# Patient Record
Sex: MALE | Race: WHITE | NOT HISPANIC OR LATINO
[De-identification: names, ages, dates, MRNs, and addresses within clinical notes are randomized per-mention and may not be internally consistent; named-entity substitution may affect disease eponyms.]

---

## 2017-01-11 ENCOUNTER — APPOINTMENT (OUTPATIENT)
Dept: FAMILY MEDICINE | Facility: CLINIC | Age: 62
End: 2017-01-11

## 2017-02-25 ENCOUNTER — APPOINTMENT (OUTPATIENT)
Dept: FAMILY MEDICINE | Facility: CLINIC | Age: 62
End: 2017-02-25

## 2017-02-25 VITALS
DIASTOLIC BLOOD PRESSURE: 74 MMHG | RESPIRATION RATE: 12 BRPM | HEIGHT: 69 IN | BODY MASS INDEX: 29.33 KG/M2 | WEIGHT: 198 LBS | HEART RATE: 97 BPM | OXYGEN SATURATION: 99 % | SYSTOLIC BLOOD PRESSURE: 126 MMHG

## 2017-03-25 ENCOUNTER — APPOINTMENT (OUTPATIENT)
Dept: FAMILY MEDICINE | Facility: CLINIC | Age: 62
End: 2017-03-25

## 2017-06-02 ENCOUNTER — RX RENEWAL (OUTPATIENT)
Age: 62
End: 2017-06-02

## 2017-06-16 ENCOUNTER — APPOINTMENT (OUTPATIENT)
Dept: FAMILY MEDICINE | Facility: CLINIC | Age: 62
End: 2017-06-16

## 2017-06-29 ENCOUNTER — LABORATORY RESULT (OUTPATIENT)
Age: 62
End: 2017-06-29

## 2017-06-30 ENCOUNTER — LABORATORY RESULT (OUTPATIENT)
Age: 62
End: 2017-06-30

## 2017-06-30 ENCOUNTER — APPOINTMENT (OUTPATIENT)
Dept: FAMILY MEDICINE | Facility: CLINIC | Age: 62
End: 2017-06-30

## 2017-06-30 VITALS
OXYGEN SATURATION: 98 % | HEART RATE: 92 BPM | BODY MASS INDEX: 28.88 KG/M2 | WEIGHT: 195 LBS | SYSTOLIC BLOOD PRESSURE: 128 MMHG | DIASTOLIC BLOOD PRESSURE: 70 MMHG | HEIGHT: 69 IN | TEMPERATURE: 98.3 F | RESPIRATION RATE: 12 BRPM

## 2017-07-03 LAB
25(OH)D3 SERPL-MCNC: 43.1 NG/ML
ALBUMIN SERPL ELPH-MCNC: 4.3 G/DL
ALP BLD-CCNC: 65 U/L
ALT SERPL-CCNC: 30 U/L
ANION GAP SERPL CALC-SCNC: 16 MMOL/L
APPEARANCE: ABNORMAL
AST SERPL-CCNC: 21 U/L
BASOPHILS # BLD AUTO: 0.01 K/UL
BASOPHILS NFR BLD AUTO: 0.2 %
BILIRUB SERPL-MCNC: 0.2 MG/DL
BILIRUBIN URINE: NEGATIVE
BLOOD URINE: ABNORMAL
BUN SERPL-MCNC: 18 MG/DL
C PEPTIDE SERPL-MCNC: 5.5 NG/ML
CALCIUM SERPL-MCNC: 12.1 MG/DL
CHLORIDE SERPL-SCNC: 102 MMOL/L
CHOLEST SERPL-MCNC: 141 MG/DL
CHOLEST/HDLC SERPL: 3.5 RATIO
CO2 SERPL-SCNC: 23 MMOL/L
COLOR: YELLOW
CREAT SERPL-MCNC: 1.15 MG/DL
EOSINOPHIL # BLD AUTO: 0.14 K/UL
EOSINOPHIL NFR BLD AUTO: 2.2 %
GLUCOSE QUALITATIVE U: 100 MG/DL
GLUCOSE SERPL-MCNC: 219 MG/DL
HBA1C MFR BLD HPLC: 8.7 %
HCT VFR BLD CALC: 38.7 %
HDLC SERPL-MCNC: 40 MG/DL
HGB BLD-MCNC: 12.9 G/DL
IMM GRANULOCYTES NFR BLD AUTO: 0.3 %
KETONES URINE: NEGATIVE
LDLC SERPL CALC-MCNC: 61 MG/DL
LEUKOCYTE ESTERASE URINE: ABNORMAL
LYMPHOCYTES # BLD AUTO: 1.66 K/UL
LYMPHOCYTES NFR BLD AUTO: 26.1 %
MAN DIFF?: NORMAL
MCHC RBC-ENTMCNC: 30.9 PG
MCHC RBC-ENTMCNC: 33.3 GM/DL
MCV RBC AUTO: 92.8 FL
MONOCYTES # BLD AUTO: 0.44 K/UL
MONOCYTES NFR BLD AUTO: 6.9 %
NEUTROPHILS # BLD AUTO: 4.08 K/UL
NEUTROPHILS NFR BLD AUTO: 64.3 %
NITRITE URINE: NEGATIVE
PH URINE: 6
PLATELET # BLD AUTO: 193 K/UL
POTASSIUM SERPL-SCNC: 4.7 MMOL/L
PROT SERPL-MCNC: 6.8 G/DL
PROTEIN URINE: 30 MG/DL
RBC # BLD: 4.17 M/UL
RBC # FLD: 13.4 %
SODIUM SERPL-SCNC: 141 MMOL/L
SPECIFIC GRAVITY URINE: 1.01
TRIGL SERPL-MCNC: 198 MG/DL
UROBILINOGEN URINE: NORMAL MG/DL
WBC # FLD AUTO: 6.35 K/UL

## 2017-07-14 ENCOUNTER — APPOINTMENT (OUTPATIENT)
Dept: FAMILY MEDICINE | Facility: CLINIC | Age: 62
End: 2017-07-14

## 2017-07-14 VITALS
HEART RATE: 88 BPM | BODY MASS INDEX: 28.44 KG/M2 | DIASTOLIC BLOOD PRESSURE: 78 MMHG | OXYGEN SATURATION: 98 % | SYSTOLIC BLOOD PRESSURE: 146 MMHG | TEMPERATURE: 98.4 F | WEIGHT: 192 LBS | RESPIRATION RATE: 12 BRPM | HEIGHT: 69 IN

## 2017-07-17 LAB
CALCIUM SERPL-MCNC: 12.2 MG/DL
PARATHYROID HORMONE INTACT: 85 PG/ML
T4 FREE SERPL-MCNC: 1.2 NG/DL
T4 SERPL-MCNC: 8.1 UG/DL
TSH SERPL-ACNC: 3.38 UIU/ML

## 2017-08-10 ENCOUNTER — RX RENEWAL (OUTPATIENT)
Age: 62
End: 2017-08-10

## 2018-05-04 ENCOUNTER — APPOINTMENT (OUTPATIENT)
Dept: FAMILY MEDICINE | Facility: CLINIC | Age: 63
End: 2018-05-04
Payer: COMMERCIAL

## 2018-05-04 VITALS
WEIGHT: 194 LBS | TEMPERATURE: 98.2 F | OXYGEN SATURATION: 98 % | HEIGHT: 69 IN | DIASTOLIC BLOOD PRESSURE: 74 MMHG | SYSTOLIC BLOOD PRESSURE: 136 MMHG | HEART RATE: 93 BPM | BODY MASS INDEX: 28.73 KG/M2 | RESPIRATION RATE: 12 BRPM

## 2018-05-04 PROCEDURE — 99214 OFFICE O/P EST MOD 30 MIN: CPT

## 2018-05-14 ENCOUNTER — RX RENEWAL (OUTPATIENT)
Age: 63
End: 2018-05-14

## 2018-05-25 ENCOUNTER — APPOINTMENT (OUTPATIENT)
Dept: FAMILY MEDICINE | Facility: CLINIC | Age: 63
End: 2018-05-25
Payer: COMMERCIAL

## 2018-05-25 VITALS
OXYGEN SATURATION: 98 % | SYSTOLIC BLOOD PRESSURE: 136 MMHG | DIASTOLIC BLOOD PRESSURE: 74 MMHG | HEIGHT: 69 IN | TEMPERATURE: 98.3 F | BODY MASS INDEX: 27.7 KG/M2 | WEIGHT: 187 LBS | HEART RATE: 90 BPM | RESPIRATION RATE: 12 BRPM

## 2018-05-25 LAB
BUN SERPL-MCNC: 18
CA-I SERPL-SCNC: 1.3
CHLORIDE SERPL-SCNC: 105
CO2 BLDA-SCNC: 24
CREAT SERPL-MCNC: 1.1
GLUCOSE SERPL-MCNC: 320
HBA1C MFR BLD HPLC: 11.2
HEMOGLOBIN: 12.9
NT-PROBNP SERPL-MCNC: NORMAL
POTASSIUM SERPL-SCNC: 4
SODIUM SERPL-SCNC: 137

## 2018-05-25 PROCEDURE — 80047 BASIC METABLC PNL IONIZED CA: CPT | Mod: QW

## 2018-05-25 PROCEDURE — 83036 HEMOGLOBIN GLYCOSYLATED A1C: CPT | Mod: QW

## 2018-05-25 PROCEDURE — 99214 OFFICE O/P EST MOD 30 MIN: CPT | Mod: 25

## 2018-05-25 NOTE — HISTORY OF PRESENT ILLNESS
[FreeTextEntry1] : F/U Apt. to Review Lab Testing [de-identified] : F/U Apt. to Review Lab Testing

## 2018-05-25 NOTE — REVIEW OF SYSTEMS
[Patient Intake Form Reviewed] : Patient intake form was reviewed [Fatigue] : fatigue [Back Pain] : back pain [Anxiety] : anxiety [Negative] : Heme/Lymph [FreeTextEntry2] : Overweight [FreeTextEntry5] : HLD, HTN [FreeTextEntry8] : BPH w LUTS [FreeTextEntry9] : LS Radiculopathy [FreeTextEntry1] : DM, Low Vit. D, Hypercalcemia, Hyperthyroidism

## 2018-05-25 NOTE — HISTORY OF PRESENT ILLNESS
[FreeTextEntry1] : F/U Apt. to Review Lab Testing [de-identified] : F/U Apt. to Review Lab Testing

## 2018-06-15 ENCOUNTER — APPOINTMENT (OUTPATIENT)
Dept: FAMILY MEDICINE | Facility: CLINIC | Age: 63
End: 2018-06-15

## 2018-11-01 ENCOUNTER — RX RENEWAL (OUTPATIENT)
Age: 63
End: 2018-11-01

## 2018-12-01 ENCOUNTER — APPOINTMENT (OUTPATIENT)
Dept: FAMILY MEDICINE | Facility: CLINIC | Age: 63
End: 2018-12-01

## 2018-12-20 ENCOUNTER — LABORATORY RESULT (OUTPATIENT)
Age: 63
End: 2018-12-20

## 2018-12-20 ENCOUNTER — APPOINTMENT (OUTPATIENT)
Dept: FAMILY MEDICINE | Facility: CLINIC | Age: 63
End: 2018-12-20
Payer: COMMERCIAL

## 2018-12-20 VITALS
HEIGHT: 69 IN | WEIGHT: 187 LBS | DIASTOLIC BLOOD PRESSURE: 82 MMHG | OXYGEN SATURATION: 98 % | SYSTOLIC BLOOD PRESSURE: 140 MMHG | TEMPERATURE: 98 F | BODY MASS INDEX: 27.7 KG/M2 | HEART RATE: 81 BPM

## 2018-12-20 PROCEDURE — 99214 OFFICE O/P EST MOD 30 MIN: CPT

## 2018-12-20 RX ORDER — LINAGLIPTIN 5 MG/1
5 TABLET, FILM COATED ORAL DAILY
Qty: 90 | Refills: 3 | Status: DISCONTINUED | COMMUNITY
Start: 2017-07-14 | End: 2018-12-20

## 2018-12-20 RX ORDER — PIOGLITAZONE HYDROCHLORIDE 15 MG/1
15 TABLET ORAL
Qty: 90 | Refills: 3 | Status: DISCONTINUED | COMMUNITY
Start: 2018-05-25 | End: 2018-12-20

## 2018-12-20 NOTE — PLAN
[FreeTextEntry1] : Patient in for follow up Patient s/p CABG 4 vessels  3 weeks  at [facility]  of HTN HLD Fatigue comes with wife

## 2018-12-20 NOTE — HISTORY OF PRESENT ILLNESS
[FreeTextEntry1] : Patient in for follow up Patient s/p CABG 4 vessels  3 weeks  at [facility]  of HTN HLD Fatigue comes with wife

## 2018-12-20 NOTE — REVIEW OF SYSTEMS
[Negative] : Heme/Lymph [Fever] : no fever [Chills] : no chills [Fatigue] : no fatigue [Pain] : no pain [Redness] : no redness [Itching] : no itching [Earache] : no earache [Hearing Loss] : no hearing loss [Nosebleeds] : no nosebleeds [Nasal Discharge] : no nasal discharge [Sore Throat] : no sore throat [Chest Pain] : no chest pain [Palpitations] : no palpitations [Claudication] : no  leg claudication [Wheezing] : no wheezing [Cough] : no cough [Dysuria] : no dysuria [Incontinence] : no incontinence [Hesitancy] : no hesitancy [Nocturia] : no nocturia [Hematuria] : no hematuria [Frequency] : no frequency [Joint Pain] : no joint pain [Joint Stiffness] : no joint stiffness [Muscle Pain] : no muscle pain [Muscle Weakness] : no muscle weakness [Back Pain] : no back pain [Joint Swelling] : no joint swelling [Mole Changes] : no mole changes [Nail Changes] : no nail changes [Hair Changes] : no hair changes [Headache] : no headache [Dizziness] : no dizziness [Fainting] : no fainting [Memory Loss] : no memory loss

## 2018-12-21 LAB
24R-OH-CALCIDIOL SERPL-MCNC: 86.1 PG/ML
ALBUMIN SERPL ELPH-MCNC: 5.1 G/DL
ALP BLD-CCNC: 71 U/L
ALT SERPL-CCNC: 23 U/L
ANION GAP SERPL CALC-SCNC: 12 MMOL/L
APPEARANCE: ABNORMAL
AST SERPL-CCNC: 20 U/L
BASOPHILS # BLD AUTO: 0.02 K/UL
BASOPHILS NFR BLD AUTO: 0.3 %
BILIRUB SERPL-MCNC: 0.5 MG/DL
BILIRUBIN URINE: NEGATIVE
BLOOD URINE: ABNORMAL
BUN SERPL-MCNC: 22 MG/DL
CALCIUM SERPL-MCNC: 12.9 MG/DL
CHLORIDE SERPL-SCNC: 104 MMOL/L
CHOLEST SERPL-MCNC: 150 MG/DL
CHOLEST/HDLC SERPL: 3.4 RATIO
CO2 SERPL-SCNC: 26 MMOL/L
COLOR: YELLOW
CREAT SERPL-MCNC: 1.2 MG/DL
CREAT SPEC-SCNC: 95 MG/DL
EOSINOPHIL # BLD AUTO: 0.2 K/UL
EOSINOPHIL NFR BLD AUTO: 2.7 %
ERYTHROCYTE [SEDIMENTATION RATE] IN BLOOD BY WESTERGREN METHOD: 31 MM/HR
GLUCOSE QUALITATIVE U: NEGATIVE MG/DL
GLUCOSE SERPL-MCNC: 117 MG/DL
HBA1C MFR BLD HPLC: 7.2 %
HCT VFR BLD CALC: 41.3 %
HDLC SERPL-MCNC: 44 MG/DL
HGB BLD-MCNC: 13.2 G/DL
IMM GRANULOCYTES NFR BLD AUTO: 0.3 %
KETONES URINE: NEGATIVE
LDLC SERPL CALC-MCNC: 76 MG/DL
LEUKOCYTE ESTERASE URINE: ABNORMAL
LYMPHOCYTES # BLD AUTO: 1.42 K/UL
LYMPHOCYTES NFR BLD AUTO: 19.4 %
MAN DIFF?: NORMAL
MCHC RBC-ENTMCNC: 30.2 PG
MCHC RBC-ENTMCNC: 32 GM/DL
MCV RBC AUTO: 94.5 FL
MICROALBUMIN 24H UR DL<=1MG/L-MCNC: 46.9 MG/DL
MICROALBUMIN/CREAT 24H UR-RTO: 496 MG/G
MONOCYTES # BLD AUTO: 0.63 K/UL
MONOCYTES NFR BLD AUTO: 8.6 %
NEUTROPHILS # BLD AUTO: 5.03 K/UL
NEUTROPHILS NFR BLD AUTO: 68.7 %
NITRITE URINE: NEGATIVE
PH URINE: 5.5
PLATELET # BLD AUTO: 241 K/UL
POTASSIUM SERPL-SCNC: 4.8 MMOL/L
PROT SERPL-MCNC: 7.6 G/DL
PROTEIN URINE: 100 MG/DL
RBC # BLD: 4.37 M/UL
RBC # FLD: 14.3 %
SODIUM SERPL-SCNC: 142 MMOL/L
SPECIFIC GRAVITY URINE: 1.02
TRIGL SERPL-MCNC: 152 MG/DL
TSH SERPL-ACNC: 2.13 UIU/ML
UROBILINOGEN URINE: NEGATIVE MG/DL
WBC # FLD AUTO: 7.32 K/UL

## 2018-12-28 ENCOUNTER — RX RENEWAL (OUTPATIENT)
Age: 63
End: 2018-12-28

## 2018-12-28 RX ORDER — METOPROLOL TARTRATE 50 MG/1
50 TABLET, FILM COATED ORAL DAILY
Qty: 90 | Refills: 0 | Status: DISCONTINUED | COMMUNITY
Start: 2018-12-20 | End: 2018-12-28

## 2019-01-25 ENCOUNTER — APPOINTMENT (OUTPATIENT)
Dept: FAMILY MEDICINE | Facility: CLINIC | Age: 64
End: 2019-01-25
Payer: COMMERCIAL

## 2019-01-25 VITALS
DIASTOLIC BLOOD PRESSURE: 80 MMHG | HEART RATE: 83 BPM | TEMPERATURE: 97.7 F | RESPIRATION RATE: 13 BRPM | WEIGHT: 180 LBS | BODY MASS INDEX: 26.66 KG/M2 | SYSTOLIC BLOOD PRESSURE: 140 MMHG | OXYGEN SATURATION: 98 % | HEIGHT: 69 IN

## 2019-01-25 DIAGNOSIS — Z82.49 FAMILY HISTORY OF ISCHEMIC HEART DISEASE AND OTHER DISEASES OF THE CIRCULATORY SYSTEM: ICD-10-CM

## 2019-01-25 DIAGNOSIS — R39.9 UNSPECIFIED SYMPTOMS AND SIGNS INVOLVING THE GENITOURINARY SYSTEM: ICD-10-CM

## 2019-01-25 DIAGNOSIS — Z12.5 ENCOUNTER FOR SCREENING FOR MALIGNANT NEOPLASM OF PROSTATE: ICD-10-CM

## 2019-01-25 DIAGNOSIS — E66.3 OVERWEIGHT: ICD-10-CM

## 2019-01-25 LAB
BILIRUB UR QL STRIP: NEGATIVE
CLARITY UR: NORMAL
COLLECTION METHOD: NORMAL
GLUCOSE UR-MCNC: NEGATIVE
HCG UR QL: 0.2 EU/DL
HGB UR QL STRIP.AUTO: ABNORMAL
KETONES UR-MCNC: NEGATIVE
LEUKOCYTE ESTERASE UR QL STRIP: ABNORMAL
NITRITE UR QL STRIP: NEGATIVE
PH UR STRIP: 6
PROT UR STRIP-MCNC: 100
SP GR UR STRIP: 1.02

## 2019-01-25 PROCEDURE — 82962 GLUCOSE BLOOD TEST: CPT

## 2019-01-25 PROCEDURE — 81003 URINALYSIS AUTO W/O SCOPE: CPT | Mod: QW

## 2019-01-25 PROCEDURE — 99214 OFFICE O/P EST MOD 30 MIN: CPT | Mod: 25

## 2019-01-25 NOTE — PLAN
[FreeTextEntry1] : Patient in for follow up abnormal   UTI and DM Patient s/p CABG 4 vessels  3 weeks  at [facility]  of HTN HLD Fatigue has improved Care plan reviewed \par Meds and labs reviewed RTC 3

## 2019-01-25 NOTE — HISTORY OF PRESENT ILLNESS
[FreeTextEntry1] : Patient in for follow up abnormal   UTI and DM Patient s/p CABG 4 vessels  3 weeks  at [facility]  of HTN HLD Fatigue has improved

## 2019-01-27 LAB — BACTERIA UR CULT: NORMAL

## 2019-06-11 ENCOUNTER — LABORATORY RESULT (OUTPATIENT)
Age: 64
End: 2019-06-11

## 2019-06-11 ENCOUNTER — APPOINTMENT (OUTPATIENT)
Dept: FAMILY MEDICINE | Facility: CLINIC | Age: 64
End: 2019-06-11
Payer: COMMERCIAL

## 2019-06-11 VITALS
BODY MASS INDEX: 26.66 KG/M2 | WEIGHT: 180 LBS | RESPIRATION RATE: 13 BRPM | HEART RATE: 78 BPM | SYSTOLIC BLOOD PRESSURE: 118 MMHG | OXYGEN SATURATION: 97 % | HEIGHT: 69 IN | DIASTOLIC BLOOD PRESSURE: 80 MMHG | TEMPERATURE: 97.9 F

## 2019-06-11 PROCEDURE — 36415 COLL VENOUS BLD VENIPUNCTURE: CPT

## 2019-06-11 PROCEDURE — 99214 OFFICE O/P EST MOD 30 MIN: CPT | Mod: 25

## 2019-06-11 NOTE — HISTORY OF PRESENT ILLNESS
[FreeTextEntry1] : Patient in for follow up DM Patient s/p CABG 4 vessels   at [facility]  of HTN HLD Fatigue has improved has cardio in august and Optho and endocrine

## 2019-06-11 NOTE — REVIEW OF SYSTEMS
[Negative] : Psychiatric [Fever] : no fever [Fatigue] : no fatigue [Chills] : no chills [Itching] : no itching [Pain] : no pain [Redness] : no redness [Hearing Loss] : no hearing loss [Earache] : no earache [Nosebleeds] : no nosebleeds [Nasal Discharge] : no nasal discharge [Chest Pain] : no chest pain [Sore Throat] : no sore throat [Palpitations] : no palpitations [Cough] : no cough [Claudication] : no  leg claudication [Wheezing] : no wheezing [Incontinence] : no incontinence [Dysuria] : no dysuria [Hesitancy] : no hesitancy [Nocturia] : no nocturia [Hematuria] : no hematuria [Frequency] : no frequency [Muscle Pain] : no muscle pain [Joint Stiffness] : no joint stiffness [Joint Pain] : no joint pain [Back Pain] : no back pain [Muscle Weakness] : no muscle weakness [Joint Swelling] : no joint swelling [Mole Changes] : no mole changes [Nail Changes] : no nail changes [Hair Changes] : no hair changes [Headache] : no headache [Dizziness] : no dizziness [Memory Loss] : no memory loss [Fainting] : no fainting

## 2019-06-11 NOTE — COUNSELING
[Healthy eating counseling provided] : healthy eating [Weight management counseling provided] : Weight management [Behavioral health counseling provided] : behavioral health  [Activity counseling provided] : activity [Fall prevention counseling provided] : fall prevention  [Decrease Portions] : Decrease food portions [Low Salt Diet] : Low salt diet [Walking] : Walking [Keep Food Diary] : Keep food diary [Engage in a relaxing activity] : Engage in a relaxing activity [Plan in advance] : Plan in advance [Use proper foot wear] : Use proper foot wear [None] : None

## 2019-06-11 NOTE — HEALTH RISK ASSESSMENT
[No falls in past year] : Patient reported no falls in the past year [0] : 1) Little interest or pleasure doing things: Not at all (0) [] : No [[id number]] : 0 [[id number]] : 0

## 2019-06-11 NOTE — PLAN
[FreeTextEntry1] : Patient in for follow up DM Patient s/p CABG 4 vessels   at [facility]  of HTN HLD Fatigue has improved has cardio in august and Optho and endocrine  Care plan reviewed \par Meds and labs reviewed RTC 3  months

## 2019-06-13 LAB
ALBUMIN SERPL ELPH-MCNC: 5.1 G/DL
ALP BLD-CCNC: 57 U/L
ALT SERPL-CCNC: 28 U/L
ANION GAP SERPL CALC-SCNC: 15 MMOL/L
APPEARANCE: ABNORMAL
AST SERPL-CCNC: 24 U/L
BASOPHILS # BLD AUTO: 0.02 K/UL
BASOPHILS NFR BLD AUTO: 0.3 %
BILIRUB SERPL-MCNC: 0.6 MG/DL
BILIRUBIN URINE: NEGATIVE
BLOOD URINE: ABNORMAL
BUN SERPL-MCNC: 31 MG/DL
CALCIUM SERPL-MCNC: 12.3 MG/DL
CHLORIDE SERPL-SCNC: 103 MMOL/L
CHOLEST SERPL-MCNC: 149 MG/DL
CHOLEST/HDLC SERPL: 4.1 RATIO
CO2 SERPL-SCNC: 23 MMOL/L
COLOR: NORMAL
CREAT SERPL-MCNC: 1.42 MG/DL
CREAT SPEC-SCNC: 67 MG/DL
EOSINOPHIL # BLD AUTO: 0.1 K/UL
EOSINOPHIL NFR BLD AUTO: 1.5 %
ERYTHROCYTE [SEDIMENTATION RATE] IN BLOOD BY WESTERGREN METHOD: 17 MM/HR
ESTIMATED AVERAGE GLUCOSE: 114 MG/DL
GLUCOSE QUALITATIVE U: NEGATIVE
GLUCOSE SERPL-MCNC: 121 MG/DL
HBA1C MFR BLD HPLC: 5.6 %
HCT VFR BLD CALC: 46.5 %
HDLC SERPL-MCNC: 36 MG/DL
HGB BLD-MCNC: 14.8 G/DL
IMM GRANULOCYTES NFR BLD AUTO: 0.4 %
KETONES URINE: NEGATIVE
LDLC SERPL CALC-MCNC: 57 MG/DL
LEUKOCYTE ESTERASE URINE: ABNORMAL
LYMPHOCYTES # BLD AUTO: 1.54 K/UL
LYMPHOCYTES NFR BLD AUTO: 22.4 %
MAN DIFF?: NORMAL
MCHC RBC-ENTMCNC: 31.8 GM/DL
MCHC RBC-ENTMCNC: 32.1 PG
MCV RBC AUTO: 100.9 FL
MICROALBUMIN 24H UR DL<=1MG/L-MCNC: 21.1 MG/DL
MICROALBUMIN/CREAT 24H UR-RTO: 317 MG/G
MONOCYTES # BLD AUTO: 0.51 K/UL
MONOCYTES NFR BLD AUTO: 7.4 %
NEUTROPHILS # BLD AUTO: 4.68 K/UL
NEUTROPHILS NFR BLD AUTO: 68 %
NITRITE URINE: NEGATIVE
PH URINE: 6
PLATELET # BLD AUTO: 152 K/UL
POTASSIUM SERPL-SCNC: 4.8 MMOL/L
PROT SERPL-MCNC: 7.6 G/DL
PROTEIN URINE: ABNORMAL
PSA SERPL-MCNC: 0.37 NG/ML
RBC # BLD: 4.61 M/UL
RBC # FLD: 14.6 %
SODIUM SERPL-SCNC: 141 MMOL/L
SPECIFIC GRAVITY URINE: 1.01
T4 SERPL-MCNC: 5.9 UG/DL
TESTOST SERPL-MCNC: 417 NG/DL
TRIGL SERPL-MCNC: 278 MG/DL
TSH SERPL-ACNC: 1.09 UIU/ML
UROBILINOGEN URINE: NORMAL
WBC # FLD AUTO: 6.88 K/UL

## 2019-09-06 ENCOUNTER — LABORATORY RESULT (OUTPATIENT)
Age: 64
End: 2019-09-06

## 2019-09-06 ENCOUNTER — APPOINTMENT (OUTPATIENT)
Dept: FAMILY MEDICINE | Facility: CLINIC | Age: 64
End: 2019-09-06
Payer: COMMERCIAL

## 2019-09-06 VITALS
OXYGEN SATURATION: 98 % | SYSTOLIC BLOOD PRESSURE: 122 MMHG | HEIGHT: 69 IN | HEART RATE: 77 BPM | TEMPERATURE: 97.7 F | WEIGHT: 182 LBS | DIASTOLIC BLOOD PRESSURE: 70 MMHG | BODY MASS INDEX: 26.96 KG/M2 | RESPIRATION RATE: 12 BRPM

## 2019-09-06 DIAGNOSIS — Z00.00 ENCOUNTER FOR GENERAL ADULT MEDICAL EXAMINATION W/OUT ABNORMAL FINDINGS: ICD-10-CM

## 2019-09-06 DIAGNOSIS — Z87.440 PERSONAL HISTORY OF URINARY (TRACT) INFECTIONS: ICD-10-CM

## 2019-09-06 PROCEDURE — 99214 OFFICE O/P EST MOD 30 MIN: CPT | Mod: 25

## 2019-09-06 PROCEDURE — 36415 COLL VENOUS BLD VENIPUNCTURE: CPT

## 2019-09-06 PROCEDURE — 83036 HEMOGLOBIN GLYCOSYLATED A1C: CPT | Mod: QW

## 2019-09-06 RX ORDER — CIPROFLOXACIN HYDROCHLORIDE 500 MG/1
500 TABLET, FILM COATED ORAL
Qty: 20 | Refills: 0 | Status: DISCONTINUED | COMMUNITY
Start: 2019-06-18 | End: 2019-09-06

## 2019-09-06 RX ORDER — METOPROLOL SUCCINATE 50 MG/1
50 TABLET, EXTENDED RELEASE ORAL
Qty: 90 | Refills: 0 | Status: DISCONTINUED | COMMUNITY
Start: 2018-12-28 | End: 2019-09-06

## 2019-09-06 RX ORDER — INSULIN GLARGINE 100 [IU]/ML
100 INJECTION, SOLUTION SUBCUTANEOUS DAILY
Qty: 3 | Refills: 0 | Status: DISCONTINUED | COMMUNITY
Start: 2018-12-20 | End: 2019-09-06

## 2019-09-06 RX ORDER — INSULIN LISPRO 100 [IU]/ML
100 INJECTION, SOLUTION INTRAVENOUS; SUBCUTANEOUS 3 TIMES DAILY
Qty: 3 | Refills: 6 | Status: DISCONTINUED | COMMUNITY
Start: 2018-12-20 | End: 2019-09-06

## 2019-09-06 NOTE — COUNSELING
[Potential consequences of obesity discussed] : Potential consequences of obesity discussed [Encouraged to increase physical activity] : Encouraged to increase physical activity [Encouraged to maintain food diary] : Encouraged to maintain food diary [Benefits of weight loss discussed] : Benefits of weight loss discussed [Weigh Self Weekly] : weigh self weekly [Weight management counseling provided] : Weight management [Good understanding] : Patient has a good understanding of lifestyle changes and steps needed to achieve self management goal [Healthy eating counseling provided] : healthy eating [Behavioral health counseling provided] : behavioral health  [Fall prevention counseling provided] : fall prevention  [Activity counseling provided] : activity [Low Salt Diet] : Low salt diet [Needs reinforcement, provided] : Patient needs reinforcement on understanding of disease, goals and obesity follow-up plan; reinforcement was provided [Decrease Portions] : Decrease food portions [Keep Food Diary] : Keep food diary [Plan in advance] : Plan in advance [Engage in a relaxing activity] : Engage in a relaxing activity [None] : None [Use proper foot wear] : Use proper foot wear

## 2019-09-06 NOTE — PLAN
[FreeTextEntry1] : Patient in for follow up DM Patient s/p CABG 4 vessels  PMH  of HTN HLD Fatigue has improved had cardio in august and Optho and endocrine doing well no sob or chest pain \par Care plan diet and meds reviewed \par Meds and labs reviewed RTC 3  months

## 2019-09-06 NOTE — PHYSICAL EXAM
[Well Nourished] : well nourished [No Acute Distress] : no acute distress [Normal Sclera/Conjunctiva] : normal sclera/conjunctiva [Well-Appearing] : well-appearing [Well Developed] : well developed [EOMI] : extraocular movements intact [PERRL] : pupils equal round and reactive to light [Normal Oropharynx] : the oropharynx was normal [Normal Outer Ear/Nose] : the outer ears and nose were normal in appearance [No Lymphadenopathy] : no lymphadenopathy [No JVD] : no jugular venous distention [Supple] : supple [Thyroid Normal, No Nodules] : the thyroid was normal and there were no nodules present [No Respiratory Distress] : no respiratory distress  [No Accessory Muscle Use] : no accessory muscle use [Clear to Auscultation] : lungs were clear to auscultation bilaterally [Normal Rate] : normal rate  [Regular Rhythm] : with a regular rhythm [Normal S1, S2] : normal S1 and S2 [No Carotid Bruits] : no carotid bruits [No Murmur] : no murmur heard [No Varicosities] : no varicosities [No Abdominal Bruit] : a ~M bruit was not heard ~T in the abdomen [Pedal Pulses Present] : the pedal pulses are present [No Edema] : there was no peripheral edema [No Palpable Aorta] : no palpable aorta [Soft] : abdomen soft [No Extremity Clubbing/Cyanosis] : no extremity clubbing/cyanosis [Non Tender] : non-tender [Non-distended] : non-distended [No HSM] : no HSM [No Masses] : no abdominal mass palpated [Normal Bowel Sounds] : normal bowel sounds [Normal Posterior Cervical Nodes] : no posterior cervical lymphadenopathy [Normal Anterior Cervical Nodes] : no anterior cervical lymphadenopathy [No CVA Tenderness] : no CVA  tenderness [No Spinal Tenderness] : no spinal tenderness [No Joint Swelling] : no joint swelling [Grossly Normal Strength/Tone] : grossly normal strength/tone [No Rash] : no rash [Coordination Grossly Intact] : coordination grossly intact [No Focal Deficits] : no focal deficits [Normal Gait] : normal gait [Deep Tendon Reflexes (DTR)] : deep tendon reflexes were 2+ and symmetric [Normal Insight/Judgement] : insight and judgment were intact [Normal Affect] : the affect was normal

## 2019-09-06 NOTE — HEALTH RISK ASSESSMENT
[] : No [No falls in past year] : Patient reported no falls in the past year [0] : 1) Little interest or pleasure doing things: Not at all (0) [[id number]] : 0 [[id number]] : 0

## 2019-09-06 NOTE — HISTORY OF PRESENT ILLNESS
[FreeTextEntry1] : Patient in for follow up DM Patient s/p CABG 4 vessels  PMH  of HTN HLD Fatigue has improved had cardio in august and Optho and endocrine doing well no sob or chest pain

## 2019-09-06 NOTE — REVIEW OF SYSTEMS
[Fever] : no fever [Chills] : no chills [Fatigue] : no fatigue [Pain] : no pain [Itching] : no itching [Redness] : no redness [Hearing Loss] : no hearing loss [Earache] : no earache [Nosebleeds] : no nosebleeds [Nasal Discharge] : no nasal discharge [Sore Throat] : no sore throat [Chest Pain] : no chest pain [Claudication] : no  leg claudication [Palpitations] : no palpitations [Wheezing] : no wheezing [Cough] : no cough [Dysuria] : no dysuria [Incontinence] : no incontinence [Hesitancy] : no hesitancy [Hematuria] : no hematuria [Nocturia] : no nocturia [Joint Pain] : no joint pain [Frequency] : no frequency [Joint Stiffness] : no joint stiffness [Muscle Weakness] : no muscle weakness [Muscle Pain] : no muscle pain [Back Pain] : no back pain [Mole Changes] : no mole changes [Joint Swelling] : no joint swelling [Hair Changes] : no hair changes [Nail Changes] : no nail changes [Dizziness] : no dizziness [Headache] : no headache [Fainting] : no fainting [Memory Loss] : no memory loss [Negative] : Psychiatric

## 2019-09-07 ENCOUNTER — LABORATORY RESULT (OUTPATIENT)
Age: 64
End: 2019-09-07

## 2019-09-08 LAB
24R-OH-CALCIDIOL SERPL-MCNC: 70 PG/ML
ALBUMIN SERPL ELPH-MCNC: 5.1 G/DL
ALP BLD-CCNC: 56 U/L
ALT SERPL-CCNC: 32 U/L
ANION GAP SERPL CALC-SCNC: 14 MMOL/L
APPEARANCE: CLEAR
AST SERPL-CCNC: 24 U/L
BASOPHILS # BLD AUTO: 0.02 K/UL
BASOPHILS NFR BLD AUTO: 0.3 %
BILIRUB SERPL-MCNC: 0.6 MG/DL
BILIRUBIN URINE: NEGATIVE
BLOOD URINE: ABNORMAL
BUN SERPL-MCNC: 31 MG/DL
CALCIUM SERPL-MCNC: 11.6 MG/DL
CHLORIDE SERPL-SCNC: 105 MMOL/L
CO2 SERPL-SCNC: 22 MMOL/L
COLOR: NORMAL
CREAT SERPL-MCNC: 1.39 MG/DL
EOSINOPHIL # BLD AUTO: 0.06 K/UL
EOSINOPHIL NFR BLD AUTO: 0.8 %
ERYTHROCYTE [SEDIMENTATION RATE] IN BLOOD BY WESTERGREN METHOD: 8 MM/HR
GLUCOSE QUALITATIVE U: ABNORMAL
GLUCOSE SERPL-MCNC: 153 MG/DL
HBA1C MFR BLD HPLC: 5.8
HCT VFR BLD CALC: 46.8 %
HGB BLD-MCNC: 15.5 G/DL
IMM GRANULOCYTES NFR BLD AUTO: 0.3 %
KETONES URINE: NEGATIVE
LEUKOCYTE ESTERASE URINE: ABNORMAL
LYMPHOCYTES # BLD AUTO: 1.49 K/UL
LYMPHOCYTES NFR BLD AUTO: 20.6 %
MAN DIFF?: NORMAL
MCHC RBC-ENTMCNC: 32.2 PG
MCHC RBC-ENTMCNC: 33.1 GM/DL
MCV RBC AUTO: 97.3 FL
MONOCYTES # BLD AUTO: 0.52 K/UL
MONOCYTES NFR BLD AUTO: 7.2 %
NEUTROPHILS # BLD AUTO: 5.12 K/UL
NEUTROPHILS NFR BLD AUTO: 70.8 %
NITRITE URINE: NEGATIVE
PH URINE: 6
PLATELET # BLD AUTO: 148 K/UL
POTASSIUM SERPL-SCNC: 5 MMOL/L
PROT SERPL-MCNC: 7.4 G/DL
PROTEIN URINE: ABNORMAL
RBC # BLD: 4.81 M/UL
RBC # FLD: 13.5 %
SODIUM SERPL-SCNC: 141 MMOL/L
SPECIFIC GRAVITY URINE: 1.01
TSH SERPL-ACNC: 2.49 UIU/ML
UROBILINOGEN URINE: NORMAL
WBC # FLD AUTO: 7.23 K/UL

## 2019-09-23 LAB
CHOLEST SERPL-MCNC: 134 MG/DL
CHOLEST/HDLC SERPL: 3.3 RATIO
HDLC SERPL-MCNC: 41 MG/DL
LDLC SERPL CALC-MCNC: 45 MG/DL
TRIGL SERPL-MCNC: 241 MG/DL

## 2020-01-07 ENCOUNTER — RX RENEWAL (OUTPATIENT)
Age: 65
End: 2020-01-07

## 2020-01-10 ENCOUNTER — APPOINTMENT (OUTPATIENT)
Dept: FAMILY MEDICINE | Facility: CLINIC | Age: 65
End: 2020-01-10
Payer: MEDICARE

## 2020-01-10 ENCOUNTER — LABORATORY RESULT (OUTPATIENT)
Age: 65
End: 2020-01-10

## 2020-01-10 VITALS
HEART RATE: 80 BPM | OXYGEN SATURATION: 98 % | SYSTOLIC BLOOD PRESSURE: 110 MMHG | HEIGHT: 69 IN | DIASTOLIC BLOOD PRESSURE: 80 MMHG | BODY MASS INDEX: 26.96 KG/M2 | RESPIRATION RATE: 12 BRPM | WEIGHT: 182 LBS

## 2020-01-10 DIAGNOSIS — E11.9 TYPE 2 DIABETES MELLITUS W/OUT COMPLICATIONS: ICD-10-CM

## 2020-01-10 DIAGNOSIS — F41.1 GENERALIZED ANXIETY DISORDER: ICD-10-CM

## 2020-01-10 DIAGNOSIS — M54.5 LOW BACK PAIN: ICD-10-CM

## 2020-01-10 DIAGNOSIS — I10 ESSENTIAL (PRIMARY) HYPERTENSION: ICD-10-CM

## 2020-01-10 DIAGNOSIS — E55.9 VITAMIN D DEFICIENCY, UNSPECIFIED: ICD-10-CM

## 2020-01-10 DIAGNOSIS — R82.90 UNSPECIFIED ABNORMAL FINDINGS IN URINE: ICD-10-CM

## 2020-01-10 DIAGNOSIS — E78.5 HYPERLIPIDEMIA, UNSPECIFIED: ICD-10-CM

## 2020-01-10 DIAGNOSIS — R53.83 OTHER FATIGUE: ICD-10-CM

## 2020-01-10 DIAGNOSIS — M54.16 RADICULOPATHY, LUMBAR REGION: ICD-10-CM

## 2020-01-10 PROCEDURE — 99214 OFFICE O/P EST MOD 30 MIN: CPT | Mod: 25

## 2020-01-10 PROCEDURE — 36415 COLL VENOUS BLD VENIPUNCTURE: CPT

## 2020-01-10 PROCEDURE — 83036 HEMOGLOBIN GLYCOSYLATED A1C: CPT | Mod: QW

## 2020-01-10 RX ORDER — VALSARTAN 320 MG/1
320 TABLET, COATED ORAL DAILY
Qty: 90 | Refills: 1 | Status: ACTIVE | COMMUNITY
Start: 2020-01-10 | End: 1900-01-01

## 2020-01-10 RX ORDER — GLIMEPIRIDE 2 MG/1
2 TABLET ORAL
Qty: 90 | Refills: 0 | Status: ACTIVE | COMMUNITY
Start: 2020-01-10 | End: 1900-01-01

## 2020-01-10 RX ORDER — METFORMIN ER 500 MG 500 MG/1
500 TABLET ORAL
Qty: 180 | Refills: 1 | Status: ACTIVE | COMMUNITY
Start: 2019-07-10 | End: 1900-01-01

## 2020-01-10 RX ORDER — HYDROCHLOROTHIAZIDE 25 MG/1
25 TABLET ORAL DAILY
Qty: 90 | Refills: 1 | Status: ACTIVE | COMMUNITY
Start: 2020-01-10 | End: 1900-01-01

## 2020-01-10 RX ORDER — LOSARTAN POTASSIUM 50 MG/1
50 TABLET, FILM COATED ORAL DAILY
Qty: 1 | Refills: 0 | Status: COMPLETED | COMMUNITY
Start: 2018-12-20 | End: 2020-01-10

## 2020-01-10 NOTE — HISTORY OF PRESENT ILLNESS
[FreeTextEntry1] : 3 month F/U appt. Patient in for follow up to reviewed PMH of DM, CABG 4 vessels, HTN HLD. Fatigue has improved in comparison to last visit. The patient states that he is feeling well and has not new complaints at this time. Patient states that he has been walking at least 30 minutes per day on the treadmill.  [de-identified] : 3 month F/U appt. Patient in for follow up to reviewed PMH of DM, CABG 4 vessels, HTN HLD. Fatigue has improved in comparison to last visit. The patient states that he is feeling well and has not new complaints at this time. Patient states that he has been walking at least 30 minutes per day on the treadmill.

## 2020-01-10 NOTE — REVIEW OF SYSTEMS
[Negative] : Heme/Lymph [Fatigue] : fatigue [Back Pain] : back pain [Anxiety] : anxiety [Chills] : no chills [Fever] : no fever [Redness] : no redness [Pain] : no pain [Hearing Loss] : no hearing loss [Itching] : no itching [Earache] : no earache [Nasal Discharge] : no nasal discharge [Sore Throat] : no sore throat [Nosebleeds] : no nosebleeds [Palpitations] : no palpitations [Chest Pain] : no chest pain [Wheezing] : no wheezing [Cough] : no cough [Claudication] : no  leg claudication [Hesitancy] : no hesitancy [Incontinence] : no incontinence [Dysuria] : no dysuria [Frequency] : no frequency [Nocturia] : no nocturia [Hematuria] : no hematuria [Joint Stiffness] : no joint stiffness [Joint Pain] : no joint pain [Muscle Pain] : no muscle pain [Muscle Weakness] : no muscle weakness [Joint Swelling] : no joint swelling [Hair Changes] : no hair changes [Mole Changes] : no mole changes [Nail Changes] : no nail changes [Headache] : no headache [Dizziness] : no dizziness [Fainting] : no fainting [Memory Loss] : no memory loss [FreeTextEntry5] : HLD, HTN,  [FreeTextEntry1] : DM, Low Vitamin D, Hyperparathyroidism, hypercalcemia.

## 2020-01-10 NOTE — ASSESSMENT
[FreeTextEntry1] : 3 month F/U appt. Patient in for follow up to reviewed PMH of DM, CABG 4 vessels, HTN HLD. Fatigue has improved in comparison to last visit. The patient states that he is feeling well and has not new complaints at this time. Patient states that he has been walking at least 30 minutes per day on the treadmill. \par \par Diabetic journal reviewed. Patient POCT A1C is 6.2 in the office today. \par DM, CABG 4 vessels  of HTN HLD - Medications renewed. Continue to control with medications, diet, exercise. \par \par RTC in 3 months for F/U

## 2020-01-10 NOTE — HEALTH RISK ASSESSMENT
[No falls in past year] : Patient reported no falls in the past year [0] : 2) Feeling down, depressed, or hopeless: Not at all (0) [Yes] : Yes [Monthly or less (1 pt)] : Monthly or less (1 point) [1 or 2 (0 pts)] : 1 or 2 (0 points) [Never (0 pts)] : Never (0 points) [No] : In the past 12 months have you used drugs other than those required for medical reasons? No [] : No [Audit-CScore] : 1 [de-identified] : Average [de-identified] : Average [[id number]] : 0 [TLQ7Dggod] : 0

## 2020-01-10 NOTE — PHYSICAL EXAM
[No Acute Distress] : no acute distress [Well Nourished] : well nourished [Well Developed] : well developed [Well-Appearing] : well-appearing [PERRL] : pupils equal round and reactive to light [Normal Sclera/Conjunctiva] : normal sclera/conjunctiva [EOMI] : extraocular movements intact [Normal Outer Ear/Nose] : the outer ears and nose were normal in appearance [Normal Oropharynx] : the oropharynx was normal [No JVD] : no jugular venous distention [Supple] : supple [No Lymphadenopathy] : no lymphadenopathy [Thyroid Normal, No Nodules] : the thyroid was normal and there were no nodules present [No Respiratory Distress] : no respiratory distress  [No Accessory Muscle Use] : no accessory muscle use [Clear to Auscultation] : lungs were clear to auscultation bilaterally [Normal Rate] : normal rate  [No Murmur] : no murmur heard [Regular Rhythm] : with a regular rhythm [Normal S1, S2] : normal S1 and S2 [No Abdominal Bruit] : a ~M bruit was not heard ~T in the abdomen [No Carotid Bruits] : no carotid bruits [No Varicosities] : no varicosities [Pedal Pulses Present] : the pedal pulses are present [No Palpable Aorta] : no palpable aorta [No Edema] : there was no peripheral edema [No Extremity Clubbing/Cyanosis] : no extremity clubbing/cyanosis [Non Tender] : non-tender [Soft] : abdomen soft [No Masses] : no abdominal mass palpated [Non-distended] : non-distended [No HSM] : no HSM [Normal Posterior Cervical Nodes] : no posterior cervical lymphadenopathy [Normal Bowel Sounds] : normal bowel sounds [Normal Anterior Cervical Nodes] : no anterior cervical lymphadenopathy [No Spinal Tenderness] : no spinal tenderness [No CVA Tenderness] : no CVA  tenderness [No Joint Swelling] : no joint swelling [Grossly Normal Strength/Tone] : grossly normal strength/tone [Coordination Grossly Intact] : coordination grossly intact [No Rash] : no rash [No Focal Deficits] : no focal deficits [Normal Affect] : the affect was normal [Deep Tendon Reflexes (DTR)] : deep tendon reflexes were 2+ and symmetric [Normal Gait] : normal gait [Normal Insight/Judgement] : insight and judgment were intact

## 2020-01-13 LAB
APPEARANCE: CLEAR
BASOPHILS # BLD AUTO: 0.02 K/UL
BASOPHILS NFR BLD AUTO: 0.3 %
BILIRUBIN URINE: NEGATIVE
BLOOD URINE: NORMAL
CHOLEST SERPL-MCNC: 148 MG/DL
CHOLEST/HDLC SERPL: 4.1 RATIO
COLOR: NORMAL
EOSINOPHIL # BLD AUTO: 0.09 K/UL
EOSINOPHIL NFR BLD AUTO: 1.2 %
ERYTHROCYTE [SEDIMENTATION RATE] IN BLOOD BY WESTERGREN METHOD: 32 MM/HR
GLUCOSE QUALITATIVE U: NEGATIVE
HCT VFR BLD CALC: 44.1 %
HDLC SERPL-MCNC: 36 MG/DL
HGB BLD-MCNC: 14.8 G/DL
IMM GRANULOCYTES NFR BLD AUTO: 0.5 %
KETONES URINE: NEGATIVE
LDLC SERPL CALC-MCNC: 64 MG/DL
LEUKOCYTE ESTERASE URINE: ABNORMAL
LYMPHOCYTES # BLD AUTO: 1.94 K/UL
LYMPHOCYTES NFR BLD AUTO: 26.6 %
MAN DIFF?: NORMAL
MCHC RBC-ENTMCNC: 32 PG
MCHC RBC-ENTMCNC: 33.6 GM/DL
MCV RBC AUTO: 95.2 FL
MONOCYTES # BLD AUTO: 0.6 K/UL
MONOCYTES NFR BLD AUTO: 8.2 %
NEUTROPHILS # BLD AUTO: 4.59 K/UL
NEUTROPHILS NFR BLD AUTO: 63.2 %
NITRITE URINE: NEGATIVE
PH URINE: 6.5
PLATELET # BLD AUTO: 158 K/UL
PROTEIN URINE: NORMAL
RBC # BLD: 4.63 M/UL
RBC # FLD: 13.4 %
SPECIFIC GRAVITY URINE: 1.01
TRIGL SERPL-MCNC: 241 MG/DL
TSH SERPL-ACNC: 2.82 UIU/ML
UROBILINOGEN URINE: NORMAL
WBC # FLD AUTO: 7.28 K/UL

## 2020-01-15 LAB
ALBUMIN SERPL ELPH-MCNC: 4.8 G/DL
ALP BLD-CCNC: 53 U/L
ALT SERPL-CCNC: 30 U/L
ANION GAP SERPL CALC-SCNC: 14 MMOL/L
AST SERPL-CCNC: 25 U/L
BILIRUB SERPL-MCNC: 0.5 MG/DL
BUN SERPL-MCNC: 34 MG/DL
CALCIUM SERPL-MCNC: 12 MG/DL
CHLORIDE SERPL-SCNC: 101 MMOL/L
CO2 SERPL-SCNC: 24 MMOL/L
CREAT SERPL-MCNC: 1.6 MG/DL
GLUCOSE SERPL-MCNC: 126 MG/DL
POTASSIUM SERPL-SCNC: 5.3 MMOL/L
PROT SERPL-MCNC: 7.1 G/DL
SODIUM SERPL-SCNC: 139 MMOL/L

## 2020-02-17 NOTE — PHYSICAL EXAM
X Size Of Lesion In Cm (Optional): 1.3 [No Acute Distress] : no acute distress [Well Nourished] : well nourished [Well Developed] : well developed [Normal Sclera/Conjunctiva] : normal sclera/conjunctiva [Well-Appearing] : well-appearing [EOMI] : extraocular movements intact [PERRL] : pupils equal round and reactive to light [Normal Oropharynx] : the oropharynx was normal [No JVD] : no jugular venous distention [Normal Outer Ear/Nose] : the outer ears and nose were normal in appearance [Supple] : supple [No Lymphadenopathy] : no lymphadenopathy [No Respiratory Distress] : no respiratory distress  [Thyroid Normal, No Nodules] : the thyroid was normal and there were no nodules present [Clear to Auscultation] : lungs were clear to auscultation bilaterally [No Accessory Muscle Use] : no accessory muscle use [Normal Rate] : normal rate  [Regular Rhythm] : with a regular rhythm [No Murmur] : no murmur heard [Normal S1, S2] : normal S1 and S2 [No Carotid Bruits] : no carotid bruits [No Varicosities] : no varicosities [No Abdominal Bruit] : a ~M bruit was not heard ~T in the abdomen [No Edema] : there was no peripheral edema [Pedal Pulses Present] : the pedal pulses are present [No Palpable Aorta] : no palpable aorta [No Extremity Clubbing/Cyanosis] : no extremity clubbing/cyanosis [Non Tender] : non-tender [Soft] : abdomen soft [Non-distended] : non-distended [No Masses] : no abdominal mass palpated [Normal Bowel Sounds] : normal bowel sounds [No HSM] : no HSM [Normal Anterior Cervical Nodes] : no anterior cervical lymphadenopathy [No CVA Tenderness] : no CVA  tenderness [Normal Posterior Cervical Nodes] : no posterior cervical lymphadenopathy [No Spinal Tenderness] : no spinal tenderness [No Joint Swelling] : no joint swelling [No Rash] : no rash [Grossly Normal Strength/Tone] : grossly normal strength/tone [Normal Gait] : normal gait [Deep Tendon Reflexes (DTR)] : deep tendon reflexes were 2+ and symmetric [No Focal Deficits] : no focal deficits [Coordination Grossly Intact] : coordination grossly intact [Normal Affect] : the affect was normal [Normal Insight/Judgement] : insight and judgment were intact

## 2020-04-10 ENCOUNTER — APPOINTMENT (OUTPATIENT)
Dept: FAMILY MEDICINE | Facility: CLINIC | Age: 65
End: 2020-04-10

## 2020-06-19 ENCOUNTER — RX RENEWAL (OUTPATIENT)
Age: 65
End: 2020-06-19

## 2020-06-29 ENCOUNTER — RX RENEWAL (OUTPATIENT)
Age: 65
End: 2020-06-29

## 2020-09-17 ENCOUNTER — APPOINTMENT (OUTPATIENT)
Dept: SURGERY | Facility: CLINIC | Age: 65
End: 2020-09-17
Payer: MEDICARE

## 2020-09-17 VITALS
HEART RATE: 75 BPM | HEIGHT: 69 IN | SYSTOLIC BLOOD PRESSURE: 185 MMHG | BODY MASS INDEX: 27.99 KG/M2 | DIASTOLIC BLOOD PRESSURE: 67 MMHG | WEIGHT: 189 LBS

## 2020-09-17 PROCEDURE — 99204 OFFICE O/P NEW MOD 45 MIN: CPT

## 2020-09-17 RX ORDER — ASPIRIN 81 MG
81 TABLET, DELAYED RELEASE (ENTERIC COATED) ORAL
Refills: 0 | Status: ACTIVE | COMMUNITY

## 2020-09-17 RX ORDER — ROSUVASTATIN CALCIUM 10 MG/1
10 TABLET, FILM COATED ORAL
Refills: 0 | Status: ACTIVE | COMMUNITY

## 2020-09-17 RX ORDER — ATORVASTATIN CALCIUM 20 MG/1
20 TABLET, FILM COATED ORAL
Qty: 90 | Refills: 0 | Status: DISCONTINUED | COMMUNITY
Start: 2020-05-12 | End: 2020-09-17

## 2020-09-17 RX ORDER — ALLOPURINOL 100 MG/1
100 TABLET ORAL
Refills: 0 | Status: ACTIVE | COMMUNITY

## 2020-09-17 RX ORDER — AMLODIPINE BESYLATE 5 MG/1
5 TABLET ORAL DAILY
Qty: 30 | Refills: 0 | Status: DISCONTINUED | COMMUNITY
Start: 2020-01-10 | End: 2020-09-17

## 2020-09-20 NOTE — HISTORY OF PRESENT ILLNESS
[de-identified] : Patient referred by Dr. Jenkins for evaluation of primary hyperparathyroidism. Patient reports a one year history of elevated calcium with history of kidney stones, hypertension, reflux, and lower extremity pain. Patient denies increased thirst, bone pain, shortness of breath, fatigue, dysphagia, change in voice or radiation exposure. Blood work, June 2020: Creatinine 1.4, calcium 11.4, .  Sestamibi August 5 2020: Uptake over right lower pole possible parathyroid.

## 2020-09-20 NOTE — PHYSICAL EXAM
[de-identified] : no cervical or supraclavicular adenopathy, trachea midline, thyroid without enlargement or palpable mass [Normal] : no neck adenopathy [de-identified] : Skin:  normal appearance.  no rash, nodules, vesicles, or erythema,\par Musculoskeletal:  full range of motion and no deformities appreciated\par Neurological:  grossly intact\par Psychiatric:  oriented to person, place and time with appropriate affect

## 2020-09-20 NOTE — CONSULT LETTER
[Dear  ___] : Dear  [unfilled], [Consult Letter:] : I had the pleasure of evaluating your patient, [unfilled]. [Please see my note below.] : Please see my note below. [Consult Closing:] : Thank you very much for allowing me to participate in the care of this patient.  If you have any questions, please do not hesitate to contact me. [FreeTextEntry2] : Dr. Mick Jenkins [FreeTextEntry3] : Sincerely yours,\par \par Rea Coffman MD, FACS\par Assistant Professor of Surgery\par [facility]

## 2020-09-20 NOTE — ASSESSMENT
[FreeTextEntry1] : Patient with primary hyperparathyroidism, and history of kidney stones. I have recommended parathyroidectomy with intraoperative PTH monitoring. I have requested a 4D CT scan to assist in better preoperative localization.  I have reviewed the pathophysiology of the disease process, the area anatomy and the rationale for surgery.  I have discussed the risks, benefits and alternative treatments which include but are not limited to bleeding, infection, numbness, hoarseness, hypocalcemia, scarring, and need for reoperation. I have answered the patient's questions to their satisfaction. The patient wishes to proceed with recommended procedure.They will contact my office to schedule surgery.

## 2020-09-28 ENCOUNTER — OUTPATIENT (OUTPATIENT)
Dept: OUTPATIENT SERVICES | Facility: HOSPITAL | Age: 65
LOS: 1 days | End: 2020-09-28
Payer: MEDICARE

## 2020-09-28 ENCOUNTER — APPOINTMENT (OUTPATIENT)
Dept: CT IMAGING | Facility: IMAGING CENTER | Age: 65
End: 2020-09-28
Payer: MEDICARE

## 2020-09-28 ENCOUNTER — RESULT REVIEW (OUTPATIENT)
Age: 65
End: 2020-09-28

## 2020-09-28 DIAGNOSIS — E21.0 PRIMARY HYPERPARATHYROIDISM: ICD-10-CM

## 2020-09-28 PROCEDURE — 70492 CT SFT TSUE NCK W/O & W/DYE: CPT

## 2020-09-28 PROCEDURE — 82565 ASSAY OF CREATININE: CPT

## 2020-09-28 PROCEDURE — 70491 CT SOFT TISSUE NECK W/DYE: CPT | Mod: 26

## 2020-12-03 DIAGNOSIS — Z01.818 ENCOUNTER FOR OTHER PREPROCEDURAL EXAMINATION: ICD-10-CM

## 2020-12-04 ENCOUNTER — OUTPATIENT (OUTPATIENT)
Dept: OUTPATIENT SERVICES | Facility: HOSPITAL | Age: 65
LOS: 1 days | End: 2020-12-04
Payer: MEDICARE

## 2020-12-04 VITALS
HEART RATE: 80 BPM | TEMPERATURE: 99 F | OXYGEN SATURATION: 99 % | DIASTOLIC BLOOD PRESSURE: 82 MMHG | HEIGHT: 68.5 IN | RESPIRATION RATE: 18 BRPM | WEIGHT: 184.09 LBS | SYSTOLIC BLOOD PRESSURE: 140 MMHG

## 2020-12-04 DIAGNOSIS — Z95.1 PRESENCE OF AORTOCORONARY BYPASS GRAFT: Chronic | ICD-10-CM

## 2020-12-04 DIAGNOSIS — E21.0 PRIMARY HYPERPARATHYROIDISM: ICD-10-CM

## 2020-12-04 DIAGNOSIS — E05.90 THYROTOXICOSIS, UNSPECIFIED WITHOUT THYROTOXIC CRISIS OR STORM: ICD-10-CM

## 2020-12-04 PROCEDURE — 93010 ELECTROCARDIOGRAM REPORT: CPT

## 2020-12-04 RX ORDER — MULTIVIT-MIN/FERROUS GLUCONATE 9 MG/15 ML
1 LIQUID (ML) ORAL
Qty: 0 | Refills: 0 | DISCHARGE

## 2020-12-04 NOTE — H&P PST ADULT - COMMENTS
Denies current covid S&S or in the past, test never done Pt denies history of travel outside the country and outside [place] and denies COVID19 positive contacts within the last 14 days.

## 2020-12-04 NOTE — H&P PST ADULT - NEGATIVE CARDIOVASCULAR SYMPTOMS
no chest pain/no claudication/no dyspnea on exertion/no palpitations/no peripheral edema/no orthopnea/no paroxysmal nocturnal dyspnea

## 2020-12-04 NOTE — H&P PST ADULT - RS GEN PE MLT RESP DETAILS PC
good air movement/no rhonchi/no wheezes/no subcutaneous emphysema/no chest wall tenderness/no rales/airway patent/no intercostal retractions/breath sounds equal/clear to auscultation bilaterally/respirations non-labored

## 2020-12-04 NOTE — H&P PST ADULT - HISTORY OF PRESENT ILLNESS
63 y/o M presents to PST w/ a preop dx of primary hyperthyroidism and to be evaluated for a scheduled parathyroidectomy w/ PTH assay on 12/11/20. Pt states calcium levels increasing for the last few mos noted by pcp, Osteopenia on dexa scan, kidney stones as well w/ increased creatinine noted. pt referred to Dr Coffman for further evaluation and treatment and recommended surgical intervention at this time.

## 2020-12-04 NOTE — H&P PST ADULT - NEGATIVE ENMT SYMPTOMS
no nasal obstruction/no post-nasal discharge/no abnormal taste sensation/no gum bleeding/no dry mouth/no vertigo/no sinus symptoms/no ear pain/no nasal congestion/no nasal discharge/no recurrent cold sores/no throat pain/no dysphagia/no tinnitus/no nose bleeds/no hearing difficulty

## 2020-12-04 NOTE — H&P PST ADULT - NSANTHOSAYNRD_GEN_A_CORE
No. EDWIN screening performed.  STOP BANG Legend: 0-2 = LOW Risk; 3-4 = INTERMEDIATE Risk; 5-8 = HIGH Risk/Denies sleep studies done in the past

## 2020-12-04 NOTE — H&P PST ADULT - NSICDXPASTMEDICALHX_GEN_ALL_CORE_FT
PAST MEDICAL HISTORY:  CAD (coronary atherosclerotic disease)     DM (diabetes mellitus)     HLD (hyperlipidemia)     HTN (hypertension)

## 2020-12-04 NOTE — H&P PST ADULT - ASSESSMENT
DX: primary hyperthyroidism and evaluated for a scheduled parathyroidectomy w/ PTH assay on 12/11/20

## 2020-12-04 NOTE — H&P PST ADULT - NSICDXPROBLEM_GEN_ALL_CORE_FT
PROBLEM DIAGNOSES  Problem: Primary hyperthyroidism  Assessment and Plan: Preop instructions provided including npo status, Hibiclens wash for infection control pepcid not given due to high creat. states to be chronic due to current condition. labs in chart from pcp. Pt to c/w current meds, aware of last DM medication to be taken on 12/10 (metformin and glipizide) , no pm dose and hold on dos, aware to stop any NSAIDS, OTC herbals or MVI on 12/4. Verbilized understanding. DVT prophylasix as per primary team. MC/CC  pending, form provided. Aware to f/u on covid testing prior to sx as per pst protocol and has appt. EDWIN precautions, DM norified to OR booking via fax.

## 2020-12-08 ENCOUNTER — APPOINTMENT (OUTPATIENT)
Dept: DISASTER EMERGENCY | Facility: CLINIC | Age: 65
End: 2020-12-08

## 2020-12-10 ENCOUNTER — TRANSCRIPTION ENCOUNTER (OUTPATIENT)
Age: 65
End: 2020-12-10

## 2020-12-10 LAB — SARS-COV-2 N GENE NPH QL NAA+PROBE: NOT DETECTED

## 2020-12-11 ENCOUNTER — RESULT REVIEW (OUTPATIENT)
Age: 65
End: 2020-12-11

## 2020-12-11 ENCOUNTER — OUTPATIENT (OUTPATIENT)
Dept: OUTPATIENT SERVICES | Facility: HOSPITAL | Age: 65
LOS: 1 days | Discharge: HOME | End: 2020-12-11
Payer: MEDICARE

## 2020-12-11 ENCOUNTER — APPOINTMENT (OUTPATIENT)
Dept: SURGERY | Facility: HOSPITAL | Age: 65
End: 2020-12-11

## 2020-12-11 VITALS
DIASTOLIC BLOOD PRESSURE: 86 MMHG | OXYGEN SATURATION: 98 % | TEMPERATURE: 98 F | SYSTOLIC BLOOD PRESSURE: 148 MMHG | HEART RATE: 82 BPM | RESPIRATION RATE: 18 BRPM

## 2020-12-11 VITALS
HEART RATE: 75 BPM | HEIGHT: 68 IN | SYSTOLIC BLOOD PRESSURE: 155 MMHG | OXYGEN SATURATION: 98 % | TEMPERATURE: 98 F | DIASTOLIC BLOOD PRESSURE: 79 MMHG | RESPIRATION RATE: 15 BRPM | WEIGHT: 184.09 LBS

## 2020-12-11 DIAGNOSIS — Z95.1 PRESENCE OF AORTOCORONARY BYPASS GRAFT: Chronic | ICD-10-CM

## 2020-12-11 DIAGNOSIS — E21.0 PRIMARY HYPERPARATHYROIDISM: ICD-10-CM

## 2020-12-11 LAB
GLUCOSE BLDC GLUCOMTR-MCNC: 196 MG/DL — HIGH (ref 70–99)
PTH-INTACT IO % DIF SERPL: 142 PG/ML — HIGH (ref 15–65)
PTH-INTACT IO % DIF SERPL: 23 PG/ML — SIGNIFICANT CHANGE UP (ref 15–65)
PTH-INTACT IO % DIF SERPL: 33 PG/ML — SIGNIFICANT CHANGE UP (ref 15–65)
PTH-INTACT IO % DIF SERPL: 88 PG/ML — HIGH (ref 15–65)
PTH-INTACT SP1 SERPL-MCNC: SIGNIFICANT CHANGE UP

## 2020-12-11 PROCEDURE — 88305 TISSUE EXAM BY PATHOLOGIST: CPT | Mod: 26

## 2020-12-11 PROCEDURE — 64716 REVISION OF CRANIAL NERVE: CPT | Mod: GC,RT

## 2020-12-11 PROCEDURE — 64716 REVISION OF CRANIAL NERVE: CPT | Mod: 59

## 2020-12-11 PROCEDURE — 88331 PATH CONSLTJ SURG 1 BLK 1SPC: CPT | Mod: 26

## 2020-12-11 PROCEDURE — 60500 EXPLORE PARATHYROID GLANDS: CPT

## 2020-12-11 RX ORDER — ASCORBIC ACID 60 MG
1 TABLET,CHEWABLE ORAL
Qty: 0 | Refills: 0 | DISCHARGE

## 2020-12-11 RX ORDER — METFORMIN HYDROCHLORIDE 850 MG/1
1 TABLET ORAL
Qty: 0 | Refills: 0 | DISCHARGE

## 2020-12-11 RX ORDER — ALPRAZOLAM 0.25 MG
1 TABLET ORAL
Qty: 0 | Refills: 0 | DISCHARGE

## 2020-12-11 RX ORDER — ACETAMINOPHEN 500 MG
650 TABLET ORAL EVERY 6 HOURS
Refills: 0 | Status: DISCONTINUED | OUTPATIENT
Start: 2020-12-11 | End: 2020-12-25

## 2020-12-11 RX ORDER — BENZOCAINE AND MENTHOL 5; 1 G/100ML; G/100ML
1 LIQUID ORAL
Refills: 0 | Status: DISCONTINUED | OUTPATIENT
Start: 2020-12-11 | End: 2020-12-25

## 2020-12-11 RX ORDER — MULTIVIT-MIN/FERROUS GLUCONATE 9 MG/15 ML
1 LIQUID (ML) ORAL
Qty: 0 | Refills: 0 | DISCHARGE

## 2020-12-11 RX ORDER — ROSUVASTATIN CALCIUM 5 MG/1
1 TABLET ORAL
Qty: 0 | Refills: 0 | DISCHARGE

## 2020-12-11 RX ORDER — BENZOCAINE AND MENTHOL 5; 1 G/100ML; G/100ML
1 LIQUID ORAL
Qty: 0 | Refills: 0 | DISCHARGE
Start: 2020-12-11

## 2020-12-11 RX ORDER — ACETAMINOPHEN 500 MG
2 TABLET ORAL
Qty: 0 | Refills: 0 | DISCHARGE
Start: 2020-12-11

## 2020-12-11 RX ORDER — GLIMEPIRIDE 1 MG
1 TABLET ORAL
Qty: 0 | Refills: 0 | DISCHARGE

## 2020-12-11 RX ORDER — VALSARTAN 80 MG/1
1 TABLET ORAL
Qty: 0 | Refills: 0 | DISCHARGE

## 2020-12-11 RX ORDER — ASPIRIN/CALCIUM CARB/MAGNESIUM 324 MG
1 TABLET ORAL
Qty: 0 | Refills: 0 | DISCHARGE

## 2020-12-11 RX ADMIN — Medication 650 MILLIGRAM(S): at 16:00

## 2020-12-11 RX ADMIN — Medication 2 TABLET(S): at 15:54

## 2020-12-11 RX ADMIN — Medication 650 MILLIGRAM(S): at 16:30

## 2020-12-11 RX ADMIN — BENZOCAINE AND MENTHOL 1 LOZENGE: 5; 1 LIQUID ORAL at 16:30

## 2020-12-11 NOTE — SWALLOW BEDSIDE ASSESSMENT ADULT - MODE OF PRESENTATION
fed by clinician/spoon self fed cup/self fed cup/self fed/via single and consecutive cup sip drinking self administered

## 2020-12-11 NOTE — PACU DISCHARGE NOTE - COMMENTS
Patient was evaluated by C team. Patient can be safely discharged. Speech and swallow studies normal

## 2020-12-11 NOTE — ASU DISCHARGE PLAN (ADULT/PEDIATRIC) - CARE PROVIDER_API CALL
Rea Coffman  SURGERY  [address] [place] [address], [address]  [place] [address]  Phone: [phone]  Fax: [phone]  Scheduled Appointment: 12/17/2020

## 2020-12-11 NOTE — BRIEF OPERATIVE NOTE - OPERATION/FINDINGS
Right inferior parathyroid gland excised; weight : 1.06 g. Intraop PTH monitoring trended down from baseline; however, partial response to gland excision noted. Right superior parathyroid gland dissected and excised, with continued decrease in serum PTH. Recurrent laryngeal nerve injured. Primary repair performed with 8-0 prolene suture with ENT. Fibrin glue applied over site. Strap muscles, platysma, and skin closed in usual fashion.

## 2020-12-11 NOTE — ASU DISCHARGE PLAN (ADULT/PEDIATRIC) - ASU DC SPECIAL INSTRUCTIONSFT
Keep dressings dry for 48 hours, then may shower with steri strips.   May restart Aspirin 81mg after 48 hours.   TAKE OTC CALCIUM (500mg or 600mg) as directed, 2 tabs every 8 hours (3 times a day).  Take OTC Tylenol 325mg, 2 tabs every 6 hours as needed for pain.   May take OTC Lozenges (Cepacol) 1 tab every 4 hours for sore throat as needed.  Ice pack to neck for comfort as needed.   *Follow up with Dr. Coffman within 1 week (12/17/2020), please call the office for an appointment.

## 2020-12-11 NOTE — SWALLOW BEDSIDE ASSESSMENT ADULT - COMMENTS
Pre-Op Note 12/11/2020 - Right inferior parathyroid gland excised; weight : 1.06 g. Intraop PTH monitoring trended down from baseline; however, partial response to gland excision noted. Right superior parathyroid gland dissected and excised, with continued decrease in serum PTH. Recurrent laryngeal nerve injured. Primary repair performed with 8-0 prolene suture with ENT. Fibrin glue applied over site. Strap muscles, platysma, and skin closed in usual fashion.    Patient located in [facility]. Patient seen at bedside. Patient on stretcher with HOB 90 degrees. Patient is alert and oriented. Patient is able to follow commands and express needs at the conversational level. Patient reports that he is able to voice but not his baseline voice at this time.   Patient offers no discomfort when swallowing his own saliva; however reports feeling of "a sore throat"

## 2020-12-11 NOTE — BRIEF OPERATIVE NOTE - ASSISTANT(S)
Chief Complaint:  Recurrent and metastatic breast cancer.    ONCOLOGIC HISTORY:  1. 7/2016 Patient discovered non-tender lumps on superior middle right breast  2. 8/12/16 Screening mammogram demonstrates nodular assymetry in superior middle right breast  3. 8/17/16 US demonstrates 2 hypoechoic masses in right superior breast and diagnostic mammogram demonstrates 2 nodules in right upper breast (3X3cm and 1.3cm)  4. 8/22/16 Mammo biopsy external right breast demonstrates ER(+), KS(-) and HER-2neu (-)  invasive ductal carcinoma grade 2 with trena score of 6/9  5. 8/24/16 MRI demonstrates several masses within the right breast, to include a larger central mass, epicentered at the 11 o'clock position, estimated to measure upwards of 2.9 cm. Multiple additional pathologically enhancing masses are present posterior and lateral to the larger mass, extending into a different quadrant, consistent with multicentric breast cancer  6. 8/30/16 right mastectomy with sentinel node biopsy yielding 3 separate malignancies  7. Final pathology revealed a 4 cm 3.5 cm and a 0.4 cm primary malignancy sentinel node was positive with extracapsular extension-T2 N1 stage II B ER positive KS negative HER-2/jose negative disease.  8. PET scan revealed abnormality in the right shoulder and questionable 1 subpectoral node. MRI of the shoulder did not reveal any evidence of metastatic disease-patient also has sacral lesion. I discussed doing biopsy of subpectoral node and sacral lesion and she decided not to proceed and I supported her decision.  9. Chemotherapy consisting of 4 cycle of Adriamycin and Cytoxan and 12 weeks of Taxol started on 10/10/2016-She received 9/12 weekly taxol and treatment was discontinued in 3/2017  10. Patient to participate in cardiotoxicity trial and SWOG 1207 trial  11. Radiotherapy started in April 2017-completed in May 2017  12. Anastrazole started 6/17  13. Patient is participating on  clinical trial  Ruben everolimus versus placebo. Discontinued after she was discovered of having metastatic disease    14. Recurrent and metastatic disease to the bone and lung discovered in March 2018  15 radiotherapy completion date is 5/1/2018  16 Xgeva Faslodex and  ibrance started 4/30/2018, however Ibrance interacted with her seizure medication and was discontinued.  17. Verzenio 150 mg p.o. b.i.d. started on August 30, 2018, was  continued with Faslodex and Xgeva  Neutropenia, diarrhea  fatigue, Verzenio was decreased to 150 mg p.o. daily October 18, 2018    INTERVAL HISTORY:  Patient returns here for follow-up.  She continues to have intermittent pain due to arthritis and chronic back pain.  She had sepsis back in April, her oral chemotherapy has been on hold.  She has had some more frequent falls recently and broke her right arm, she is in a cast.  She anticipates having this removed soon.  She is ambulating with a 4 prong cane.  She continues to have fatigue.  She denies sore throat, fevers.  Her performance score is ECOG 2.     PAST MEDICAL HISTORY, ALLERGIES, AND PHYSICAL EXAM:  Reviewed, documented and available in [employer].    REVIEW OF SYSTEMS:  Medical assistant notes were reviewed and accepted.    Oncology Encounter Vitals [07/06/20 1416]   ONC OP Encounter Vitals Group      BP 96/60      Heart Rate 100      Resp       Temp 97.9 °F (36.6 °C)      Temp src Oral      SpO2 100 %      Weight 162 lb 13 oz (73.9 kg)      Height       Pain Score 9-10      Pain Location       Pain Education?       BSA (Calculated - m2) - [employer]       BMI (Calculated)        PHYSICAL EXAM:  General:  Alert, in no acute distress.  Skin:  Warm with normal turgor.  No overlying erythema.  Head:  Atraumatic and normocephalic.  Eyes:  Right eye eyelid and conjunctiva appears normal, no epiphora or discharge.  Left eye eyelid and conjunctiva appears normal, no epiphora or discharge.  Nose:  No flaring, no discharge seen.  Throat:  Oropharynx  appears normal.  Right lateral tongue with small healing superficial appearing ulcerated area.  Tongue appears dry as well as buccal mucosa  Neck:  Supple with no significant adenopathy.  Lungs:  Normal respirations, clear to auscultation, no wheezing, rhonchi or rales heard.  Heart:  Regular rhythm, no murmur present.  Abdomen: soft NT  Extremities:  Full ROM (range of motion), with normal-appearing joints.  No evidence of pitting edema in hands or feet.    MOST RECENT CBC:  Lab Results   Component Value Date/Time    WBC 4.7 07/06/2020 01:52 PM    WBC 3.6 (L) 01/02/2020 01:39 PM    RBC 3.89 (L) 07/06/2020 01:52 PM    RBC 2.84 (L) 01/02/2020 01:39 PM    HCT 37.6 07/06/2020 01:52 PM    HCT 31.2 (L) 01/02/2020 01:39 PM    HGB 12.6 07/06/2020 01:52 PM    HGB 10.2 (L) 01/02/2020 01:39 PM     07/06/2020 01:52 PM     01/02/2020 01:39 PM     11/08/2013 11:17 AM       MOST RECENT LFT:  Lab Results   Component Value Date/Time    AST 36 07/06/2020 01:52 PM    AST 44 (H) 01/02/2020 01:43 PM    GPT 38 07/06/2020 01:52 PM    GPT 34 01/02/2020 01:43 PM    ALKPT 85 07/06/2020 01:52 PM    ALKPT 62 01/02/2020 01:43 PM    BILIRUBIN 0.4 07/06/2020 01:52 PM    BILIRUBIN 0.5 01/02/2020 01:43 PM     CA27.29 (Units/mL)   Date Value   01/02/2020 22.2   12/03/2019 22.5   11/05/2019 18.5   10/08/2019 18.2   09/09/2019 15.3     Cancer Antigen 27.29 (Units/mL)   Date Value   06/19/2020 20.0   05/29/2020 19.6   04/14/2020 22.0   03/02/2020 23.2   02/03/2020 18.0       ASSESSMENT:  Ms. Lashonda Estevez is a 59 year old female who was seen here for diagnosis of metastatic breast cancer.    Patient is currently being treated with Xgeva anastrozole and Faslodex and Verzenio . She appears to be tolerating the medication quite well..  However she was neutropenic and had septic shock and Verzenio will be held for now.  As the patient is having more frequent falls and has now fractured her arm have recommended continuing to hold  Verzenio.  MRI brain will be repeated.  If MRI brain negative and patient continues to improve can consider resuming Verzenio in the next 1-2 weeks.      Discussed oral chemotherapy adherence and side effects with patient  Patient is taking medication as prescribed.She will continue Verzenio 150 mg p.o. daily.  This will be restarted in 2 more weeks.    Pain management-fentanyl 125 mcg every 48 hours will be continued. At this time she reports her pain is under good control.  She still requires Dilaudid occasionally , this will be continued at current dose     Clinical trials she was on everolimus versus placebo-she was unblinded and she was trated with everolimus    Hypocalcemia:  Patient instructed to take Tums Extra Strength b.i.d..  Will hold Xgeva today. Will monitor       Patient was counseled in detail.       PLAN:  MRI brain to evaluate dizziness and frequent falls  XGeva will be continued due today will be held for hypocalcemia, patient to take Tums Extra Strength b.i.d.  Faslodex will be continued due today  Verzenio 150 p.o. daily will be held will re-evaluate resuming after MRI brain  Anastrozole to be continued  Fentanyl to be continued if 125 mcg every 48 hours. Dilaudid for breakthrough pain.  Patient has adequate medication at home  Evaluation in 1 months time, restaging every 3 months or sooner if needed.    I appreciate the opportunity to participate in her care. Please feel free to call me for questions or concerns.  Dr. Fagan supervising physician today

## 2020-12-11 NOTE — ASU DISCHARGE PLAN (ADULT/PEDIATRIC) - NURSING INSTRUCTIONS
You received IV Tylenol for pain management at 9:30 AM. Please DO NOT take any Tylenol (Acetaminophen) containing products, such as Vicodin, Percocet, Excedrin, and cold medications for the next 6 hours (until 3:30 PM). DO NOT TAKE MORE THAN 3000 MG OF TYLENOL in a 24 hour period.

## 2020-12-11 NOTE — SWALLOW BEDSIDE ASSESSMENT ADULT - SWALLOW EVAL: DIAGNOSIS
Patient presents with Functional Oral and Pharyngeal Stage swallowing mechanism characterized by adequate oral containment, adequate chewing for solid, adequate bolus manipulation and transfer of the bolus for puree/solids. There is laryngeal elevation upon palpation, initiation of the pharyngeal swallow. There were no overt signs of impaired airway protection.

## 2020-12-11 NOTE — CHART NOTE - NSCHARTNOTEFT_GEN_A_CORE
Patient has been examined by speech and swallow. I have examined him at bedside. He can be safely discharged home.     C Team Surgery [id number]

## 2020-12-14 ENCOUNTER — NON-APPOINTMENT (OUTPATIENT)
Age: 65
End: 2020-12-14

## 2020-12-14 PROBLEM — E11.9 TYPE 2 DIABETES MELLITUS WITHOUT COMPLICATIONS: Chronic | Status: ACTIVE | Noted: 2020-12-04

## 2020-12-14 PROBLEM — I25.10 ATHEROSCLEROTIC HEART DISEASE OF NATIVE CORONARY ARTERY WITHOUT ANGINA PECTORIS: Chronic | Status: ACTIVE | Noted: 2020-12-04

## 2020-12-14 PROBLEM — E78.5 HYPERLIPIDEMIA, UNSPECIFIED: Chronic | Status: ACTIVE | Noted: 2020-12-04

## 2020-12-14 PROBLEM — I10 ESSENTIAL (PRIMARY) HYPERTENSION: Chronic | Status: ACTIVE | Noted: 2020-12-04

## 2020-12-17 ENCOUNTER — NON-APPOINTMENT (OUTPATIENT)
Age: 65
End: 2020-12-17

## 2020-12-18 DIAGNOSIS — E21.0 PRIMARY HYPERPARATHYROIDISM: ICD-10-CM

## 2020-12-21 LAB — SURGICAL PATHOLOGY STUDY: SIGNIFICANT CHANGE UP

## 2020-12-22 ENCOUNTER — APPOINTMENT (OUTPATIENT)
Dept: SURGERY | Facility: CLINIC | Age: 65
End: 2020-12-22
Payer: MEDICARE

## 2020-12-22 DIAGNOSIS — E83.52 HYPERCALCEMIA: ICD-10-CM

## 2020-12-22 PROCEDURE — 99024 POSTOP FOLLOW-UP VISIT: CPT

## 2020-12-22 PROCEDURE — 36415 COLL VENOUS BLD VENIPUNCTURE: CPT

## 2020-12-22 NOTE — HISTORY OF PRESENT ILLNESS
[de-identified] : Patient referred by Dr. Jenkins for evaluation of primary hyperparathyroidism. Patient reports a one year history of elevated calcium with history of kidney stones, hypertension, reflux, and lower extremity pain. Patient denies increased thirst, bone pain, shortness of breath, fatigue, dysphagia, change in voice or radiation exposure. Blood work, June 2020: Creatinine 1.4, calcium 11.4, .  Sestamibi August 5 2020: Uptake over right lower pole possible parathyroid.\par 12/11/2020 right superior parathyroidectomy with repair branch of RLN.  patient denies dysphagia, parathesias, aspiration or SOB.  reports weakness of voice with hoarseness. pathe benign, reviewed.

## 2020-12-22 NOTE — PHYSICAL EXAM
[de-identified] : incision healing without swelling, scar min disucssed.  no cervical or supraclavicular adenopathy, trachea midline, thyroid without enlargement or palpable mass [Normal] : no neck adenopathy [de-identified] : Skin:  normal appearance.  no rash, nodules, vesicles, or erythema,\par Musculoskeletal:  full range of motion and no deformities appreciated\par Neurological:  grossly intact\par Psychiatric:  oriented to person, place and time with appropriate affect

## 2020-12-22 NOTE — ASSESSMENT
[FreeTextEntry1] : Patient with primary hyperparathyroidism, and history of kidney stones.  Lengthy discussion with patient and wife,  operative findings reviewed.   Discussed evaluation by vocal specialist Dr. Marcus for possible temporizing vocal cord treatment.  also Speech therapy.  potential of need for permanent intevention for voice improvement discussed.  multiple questions answered. Tete sent.  will decrease to 1000 calcium daily.  RTo 6 weeks.

## 2020-12-22 NOTE — PHYSICAL EXAM
[de-identified] : incision healing without swelling, scar min disucssed.  no cervical or supraclavicular adenopathy, trachea midline, thyroid without enlargement or palpable mass [Normal] : no neck adenopathy [de-identified] : Skin:  normal appearance.  no rash, nodules, vesicles, or erythema,\par Musculoskeletal:  full range of motion and no deformities appreciated\par Neurological:  grossly intact\par Psychiatric:  oriented to person, place and time with appropriate affect

## 2020-12-22 NOTE — REASON FOR VISIT
[Post Op: _________] : a [unfilled] post op visit [Spouse] : spouse [Post-Operative Visit] : a post-operative visit [FreeTextEntry2] : S/P Right Superior Parathyroidectomy  [Other: _____] : [unfilled]

## 2020-12-22 NOTE — HISTORY OF PRESENT ILLNESS
[de-identified] : Patient referred by Dr. Jenkins for evaluation of primary hyperparathyroidism. Patient reports a one year history of elevated calcium with history of kidney stones, hypertension, reflux, and lower extremity pain. Patient denies increased thirst, bone pain, shortness of breath, fatigue, dysphagia, change in voice or radiation exposure. Blood work, June 2020: Creatinine 1.4, calcium 11.4, .  Sestamibi August 5 2020: Uptake over right lower pole possible parathyroid.\par 12/11/2020 right superior parathyroidectomy with repair branch of RLN.  patient denies dysphagia, parathesias, aspiration or SOB.  reports weakness of voice with hoarseness. pathe benign, reviewed.

## 2020-12-30 ENCOUNTER — NON-APPOINTMENT (OUTPATIENT)
Age: 65
End: 2020-12-30

## 2020-12-30 LAB
25(OH)D3 SERPL-MCNC: 39.3 NG/ML
CALCIUM SERPL-MCNC: 10.5 MG/DL
CALCIUM SERPL-MCNC: 10.5 MG/DL
PARATHYROID HORMONE INTACT: 14 PG/ML

## 2021-02-04 ENCOUNTER — APPOINTMENT (OUTPATIENT)
Dept: SURGERY | Facility: CLINIC | Age: 66
End: 2021-02-04
Payer: MEDICARE

## 2021-02-04 DIAGNOSIS — R49.0 DYSPHONIA: ICD-10-CM

## 2021-02-04 DIAGNOSIS — E21.0 PRIMARY HYPERPARATHYROIDISM: ICD-10-CM

## 2021-02-04 DIAGNOSIS — E21.3 HYPERPARATHYROIDISM, UNSPECIFIED: ICD-10-CM

## 2021-02-04 PROCEDURE — 99024 POSTOP FOLLOW-UP VISIT: CPT

## 2021-02-04 PROCEDURE — 36415 COLL VENOUS BLD VENIPUNCTURE: CPT

## 2021-02-04 RX ORDER — LANCETS 33 GAUGE
EACH MISCELLANEOUS
Qty: 100 | Refills: 0 | Status: ACTIVE | COMMUNITY
Start: 2020-07-07

## 2021-02-04 RX ORDER — COLCHICINE 0.6 MG/1
0.6 TABLET ORAL
Qty: 20 | Refills: 0 | Status: ACTIVE | COMMUNITY
Start: 2021-01-07

## 2021-02-04 RX ORDER — BLOOD SUGAR DIAGNOSTIC
STRIP MISCELLANEOUS
Qty: 100 | Refills: 0 | Status: ACTIVE | COMMUNITY
Start: 2020-07-07

## 2021-02-04 NOTE — HISTORY OF PRESENT ILLNESS
[de-identified] : Patient referred by Dr. Jenkins for evaluation of primary hyperparathyroidism. Patient reports a one year history of elevated calcium with history of kidney stones, hypertension, reflux, and lower extremity pain. Patient denies increased thirst, bone pain, shortness of breath, fatigue, dysphagia, change in voice or radiation exposure. Blood work, June 2020: Creatinine 1.4, calcium 11.4, .  Sestamibi August 5 2020: Uptake over right lower pole possible parathyroid.\par 12/11/2020 right superior parathyroidectomy with repair branch of RLN.  patient denies dysphagia, parathesias, aspiration or SOB.  reports weakness of voice with hoarseness. path benign.  currently not on calcium or vit D.  Saw Dr Jay for evaluation yesterday with right vocal cord paralysis.   He reviewed options, including, Restylane injection as a temporary measure to increase medialization of the vocal cord and improved the strength of his voice.  He also discussed surgical interventions and the possible benefit of voice therapy. The patient plans to travel to [place] for the winter and will consider intervention when he returns.

## 2021-02-04 NOTE — PHYSICAL EXAM
[Normal] : no neck adenopathy [de-identified] : incision healing well, scar min discussed.  no cervical or supraclavicular adenopathy, trachea midline, thyroid without enlargement or palpable mass [de-identified] : Skin:  normal appearance.  no rash, nodules, vesicles, or erythema,\par Musculoskeletal:  full range of motion and no deformities appreciated\par Neurological:  grossly intact\par Psychiatric:  oriented to person, place and time with appropriate affect

## 2021-02-04 NOTE — REASON FOR VISIT
Numbers slightly up.  Repeat in six months   [Post Op: _________] : a [unfilled] post op visit [Spouse] : spouse [Other: _____] : [unfilled] [FreeTextEntry2] : S/P Right Superior Parathyroidectomy

## 2021-02-04 NOTE — ASSESSMENT
Pre-Operative History & Physical  Ophthalmology      SUBJECTIVE:     History of Present Illness:  Patient is a 59 y.o. male presents with Vitreous floaters, left [H43.392].    MEDICATIONS:   No medications prior to admission.       ALLERGIES: Review of patient's allergies indicates:  No Known Allergies    PAST MEDICAL HISTORY:   Past Medical History:   Diagnosis Date    GERD (gastroesophageal reflux disease)     Ulcerative colitis     Ulcerative colitis      PAST SURGICAL HISTORY:   Past Surgical History:   Procedure Laterality Date    anal fistula      CATARACT EXTRACTION, BILATERAL      COLONOSCOPY  2016    ulcerative colitis    TONSILLECTOMY      VITRECTOMY BY PARS PLANA APPROACH Left 2018    Procedure: VITRECTOMY, PARS PLANA APPROACH;  Surgeon: DIANDRA Rushing MD;  Location: [facility] OR [address] [place] [address];  Service: Ophthalmology;  Laterality: Left;  20 min    VITRECTOMY, PARS PLANA APPROACH Left 2018    Performed by DIANDRA Rushing MD at [facility] OR [address] [place] [address]     PAST FAMILY HISTORY:   Family History   Problem Relation Age of Onset    Cataracts Mother     Glaucoma Mother     Macular degeneration Mother     Diabetes Father     Cancer Father 75        Colon    Cataracts Father     Glaucoma Father     Cancer Sister     Cancer Brother         prostate cancer     SOCIAL HISTORY:   Social History     Tobacco Use    Smoking status: Former Smoker     Packs/day: 2.00     Years: 35.00     Pack years: 70.00     Types: Cigarettes     Last attempt to quit: 2/10/2009     Years since quittin.8    Smokeless tobacco: Former User   Substance Use Topics    Alcohol use: Yes     Alcohol/week: 3.0 oz     Types: 5 Cans of beer per week     Comment: occas, none x 1 week    Drug use: Yes     Frequency: 10.0 times per week        MENTAL STATUS: Alert    REVIEW OF SYSTEMS: Negative    OBJECTIVE:     Vital Signs (Most Recent)       Physical Exam:  General: NAD  HEENT: Atraumatic  Lungs: Adequate respirations,  [FreeTextEntry1] : Patient with primary hyperparathyroidism, and history of kidney stones. 6 weeks status post parathyroidectomy with right recurrent laryngeal nerve repair. Lengthy discussion with patient and wife regarding the operative findings. We discussed the treatment options available as a temporary or permanent manner to improve his strength and quality of his voice. He does not have any symptoms of aspiration or shortness of breath.  Patient is schedule to see Tova Mansfield speech therapist  tomorrow for evaluation. I have answered their questions to their satisfaction.  Tete blank.  RTo 4mo.   LCTAB  Heart: RRR, No murmur  Abdomen: Soft NT    ASSESSMENT/PLAN:     Patient is a 59 y.o. male with Vitreous floaters, left [H43.392].     - Plan for surgical correction Plan 25g PPV/partial AFx OD  Local MAC   LOC 20min  Pt with steroid response, only use PF BID on OD   - Risks/benefits/alternatives of the procedure including, but not limited to scarring, bleeding, infection, loss or decreased vision, and/or need for possible repeat surgery discussed with the patient and family.   - Informed consent obtained prior to surgery and the patient/family voiced good understanding.    Walker Berrios  12/12/2018  6:16 AM

## 2021-02-05 ENCOUNTER — NON-APPOINTMENT (OUTPATIENT)
Age: 66
End: 2021-02-05

## 2021-02-05 LAB
25(OH)D3 SERPL-MCNC: 33.9 NG/ML
CALCIUM SERPL-MCNC: 10.2 MG/DL
CALCIUM SERPL-MCNC: 10.2 MG/DL
PARATHYROID HORMONE INTACT: 50 PG/ML
T3 SERPL-MCNC: 106 NG/DL
T4 FREE SERPL-MCNC: 1 NG/DL
TSH SERPL-ACNC: 3.75 UIU/ML

## 2021-06-10 ENCOUNTER — APPOINTMENT (OUTPATIENT)
Dept: SURGERY | Facility: CLINIC | Age: 66
End: 2021-06-10

## 2021-11-17 NOTE — H&P PST ADULT - HOW PATIENT ADDRESSED, PROFILE
traMADol (ULTRAM) 50 MG tablet [[id number]    [facility], [address], [address] [place] [address],[address] [id number]   Phone:  [phone]  Fax:  [phone] Naif

## 2023-07-19 NOTE — ASSESSMENT
[FreeTextEntry1] : Fatigue/Overweight - Labs for Fatigue and Dysmetabolism.\par HLD - Controlled with Meds.\par HTN - Controlled with Meds.\par Hypercalcemia/Hyperparathyroidism/Low Vit. D/DM - Endo Referral. Start Metformin 50 mg. 2 BID, Tradjenta 5 mg. 1 QD, Actos 15 mg. 1 QD as directed.  Vit. D 50,000 IU 1 QO wk. \par PSA Screening - PSA = 0.28. \par \par F/U 2 wks to evaluate meds.\par \par \par \par \par 
[FreeTextEntry1] : Fatigue/Overweight - Labs for Fatigue and Dysmetabolism.\par HLD - Controlled with Meds.\par HTN - Controlled with Meds.\par Hypercalcemia/Hyperparathyroidism/Low Vit. D/DM - Endo Referral. Start Metformin 50 mg. 2 BID, Tradjenta 5 mg. 1 QD, Actos 15 mg. 1 QD as directed.  Vit. D 50,000 IU 1 QO wk. \par PSA Screening - PSA = 0.28. \par \par F/U 2 wks to evaluate meds.\par \par \par \par \par 
Detail Level: Zone
Continue Regimen: Fluorouracil 5% twice a day for 2 weeks. Around mouth and on lips only once a day for 2 weeks.
Render In Strict Bullet Format?: No

## 2023-10-02 NOTE — H&P PST ADULT - MOUTH
Discharge instructions given to patient by Tabby Todd RN. Verbalized understanding of instructions. Patient discharged without difficulty. Patient discharged in stable condition via ambulation accompanied by family.        Frank Wei RN  10/02/23 [id number]
normal mouth and gums

## 2024-03-28 ENCOUNTER — TRANSCRIPTION ENCOUNTER (OUTPATIENT)
Age: 69
End: 2024-03-28